# Patient Record
Sex: FEMALE | Race: WHITE | NOT HISPANIC OR LATINO | Employment: UNEMPLOYED | URBAN - METROPOLITAN AREA
[De-identification: names, ages, dates, MRNs, and addresses within clinical notes are randomized per-mention and may not be internally consistent; named-entity substitution may affect disease eponyms.]

---

## 2023-04-27 ENCOUNTER — OFFICE VISIT (OUTPATIENT)
Dept: FAMILY MEDICINE CLINIC | Facility: CLINIC | Age: 28
End: 2023-04-27

## 2023-04-27 VITALS
TEMPERATURE: 97.2 F | OXYGEN SATURATION: 96 % | WEIGHT: 223.7 LBS | DIASTOLIC BLOOD PRESSURE: 77 MMHG | HEIGHT: 65 IN | HEART RATE: 84 BPM | SYSTOLIC BLOOD PRESSURE: 118 MMHG | BODY MASS INDEX: 37.27 KG/M2

## 2023-04-27 DIAGNOSIS — F90.9 ATTENTION DEFICIT HYPERACTIVITY DISORDER (ADHD), UNSPECIFIED ADHD TYPE: ICD-10-CM

## 2023-04-27 DIAGNOSIS — Z00.00 ANNUAL PHYSICAL EXAM: Primary | ICD-10-CM

## 2023-04-27 DIAGNOSIS — Z86.69 HISTORY OF SEIZURE DISORDER: ICD-10-CM

## 2023-04-27 DIAGNOSIS — Z13.1 SCREENING FOR DIABETES MELLITUS: ICD-10-CM

## 2023-04-27 DIAGNOSIS — Z11.59 NEED FOR HEPATITIS C SCREENING TEST: ICD-10-CM

## 2023-04-27 DIAGNOSIS — Z23 ENCOUNTER FOR IMMUNIZATION: ICD-10-CM

## 2023-04-27 DIAGNOSIS — F32.9 CURRENT EPISODE OF MAJOR DEPRESSIVE DISORDER WITHOUT PRIOR EPISODE, UNSPECIFIED DEPRESSION EPISODE SEVERITY: ICD-10-CM

## 2023-04-27 DIAGNOSIS — Z13.29 SCREENING FOR THYROID DISORDER: ICD-10-CM

## 2023-04-27 DIAGNOSIS — Z13.6 SCREENING FOR CARDIOVASCULAR CONDITION: ICD-10-CM

## 2023-04-27 DIAGNOSIS — F41.9 ANXIETY: ICD-10-CM

## 2023-04-27 DIAGNOSIS — G47.33 OSA ON CPAP: ICD-10-CM

## 2023-04-27 DIAGNOSIS — Z11.4 SCREENING FOR HIV (HUMAN IMMUNODEFICIENCY VIRUS): ICD-10-CM

## 2023-04-27 DIAGNOSIS — Z99.89 OSA ON CPAP: ICD-10-CM

## 2023-04-27 DIAGNOSIS — J45.20 MILD INTERMITTENT ASTHMA WITHOUT COMPLICATION: ICD-10-CM

## 2023-04-27 DIAGNOSIS — Z13.1 SCREENING FOR DIABETES MELLITUS: Primary | ICD-10-CM

## 2023-04-27 RX ORDER — ALBUTEROL SULFATE 90 UG/1
2 AEROSOL, METERED RESPIRATORY (INHALATION) AS NEEDED
COMMUNITY

## 2023-04-27 RX ORDER — SERTRALINE HYDROCHLORIDE 100 MG/1
150 TABLET, FILM COATED ORAL DAILY
Qty: 145 TABLET | Refills: 1 | Status: SHIPPED | OUTPATIENT
Start: 2023-04-27

## 2023-04-27 RX ORDER — BUPROPION HYDROCHLORIDE 150 MG/1
1 TABLET ORAL DAILY
COMMUNITY
Start: 2023-04-25

## 2023-04-27 RX ORDER — BUDESONIDE AND FORMOTEROL FUMARATE DIHYDRATE 160; 4.5 UG/1; UG/1
2 AEROSOL RESPIRATORY (INHALATION) EVERY 4 HOURS PRN
COMMUNITY

## 2023-04-27 RX ORDER — SERTRALINE HYDROCHLORIDE 100 MG/1
1.5 TABLET, FILM COATED ORAL DAILY
COMMUNITY
Start: 2023-02-25 | End: 2023-04-27 | Stop reason: SDUPTHER

## 2023-04-27 RX ORDER — MELATONIN 5 MG
2 TABLET,CHEWABLE ORAL
COMMUNITY

## 2023-04-27 NOTE — PROGRESS NOTES
ADULT ANNUAL [facility]    NAME: Jim Griffin  AGE: 32 y o  SEX: female  : 1995     DATE: 2023     Assessment and Plan:     Problem List Items Addressed This Visit        Respiratory    HARLEY on CPAP  stable    Mild intermittent asthma    Relevant Medications    budesonide-formoterol (SYMBICORT) 160-4 5 mcg/act inhaler    albuterol (PROVENTIL HFA,VENTOLIN HFA) 90 mcg/act inhaler  Stable  She uses symbicort with respiratory infections  Has albuterol for PRN use  Does not need refills today  She is not sure if she has had pneumococcal vaccine  Will obtain records       Other    Anxiety    Relevant Medications    sertraline (ZOLOFT) 100 mg tablet    Current episode of major depressive disorder without prior episode    Relevant Medications    buPROPion (WELLBUTRIN XL) 150 mg 24 hr tablet    sertraline (ZOLOFT) 100 mg tablet  Scored 18 on PHQ 9 today  Attributes her depressive sx to her current life stressors  Declines referral to therapist/counselor  Advised her that I would be hesitant to decrease or d/c her zoloft right now due to the fact that her depression is not well controlled right now  We can always re-visit this down the road though advised that I would be hesitant to increase the wellbutrin (in fact not even a fan that she is taking this medication) due to her seizure history as this medication can lower seizure threshold  I did refill her zoloft 150 mg today  ADHD    Relevant Medications    buPROPion (WELLBUTRIN XL) 150 mg 24 hr tablet    sertraline (ZOLOFT) 100 mg tablet  Stable on wellbutrin   Other Visit Diagnoses     Annual physical exam    -  Primary  Discussed diet and exercise and some changes that she could make to diet with respect to sugary beverages, fast foods  Screening labs ordered  She is agreeable to HIV and Hep C screening  Cervical cancer screening reportedly up to date   Will obtain records from her gyn  Immunization History   Administered Date(s) Administered   • Tdap 04/27/2023     adacel ordered today  She is not sure if she has had pneumococcal vaccine  Need for hepatitis C screening test        Relevant Orders    Hepatitis C Ab W/Refl To HCV RNA, Qn, PCR ([facility])    Screening for HIV (human immunodeficiency virus)        Relevant Orders    HIV 1/2 Antigen/Antibody (Fourth Generation) with Reflex Testing ([facility], [facility], or EXTERNAL LAB)    Screening for thyroid disorder        Relevant Orders    TSH, 3rd generation with Free T4 reflex    Screening for diabetes mellitus        Relevant Orders    Comprehensive metabolic panel    Screening for cardiovascular condition        Relevant Orders    Lipid panel    Encounter for immunization        Relevant Orders    TDAP VACCINE GREATER THAN OR EQUAL TO 6YO IM    BMI 37 0-37 9, adult        Relevant Orders    Ambulatory Referral to Weight Management          Immunizations and preventive care screenings were discussed with patient today  Appropriate education was printed on patient's after visit summary  Counseling:  Alcohol/drug use: discussed moderation in alcohol intake, the recommendations for healthy alcohol use, and avoidance of illicit drug use  Dental Health: discussed importance of regular tooth brushing, flossing, and dental visits  Exercise: the importance of regular exercise/physical activity was discussed  Recommend exercise 3-5 times per week for at least 30 minutes  Return in about 3 months (around 7/27/2023) for Recheck  Chief Complaint:     Chief Complaint   Patient presents with   • Establish Care     Patient is in the office to Establish Care with Dr Anna Gaines  No viewable records  Past PCP- Chela Salas, [address] [place] [address] / [place], [place]   Stated concerns - 1  Overdue for annual physical 2  Med review - please review and take over historical meds   3  Weight loss - per pt, she is eating better and exercising, feels meds prevent   Annual -per pt, over due  [facility]- 2021 - [facility]   Tdap - overdue - R arm   Lab- Jovan Iron      History of Present Illness:     Adult Annual Physical   Patient is a 32year old female who is being seen today as a new patient  for a comprehensive physical exam      Previous PCP=Beti CALLE at [facility] in [place], [place]  No records for review at time of today's visit  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  Saw gyn for her cervical cancer screening  ([facility])  Due for adacel  She is agreeable to getting today  Patient's chronic medical problems include anxiety ,depression, ADHD, asthma and HARLEY  Patient is taking bupropion  mg once daily as well as zoloft 150 mg once daily as previously prescribed by her previous PCP  Has only been on the wellbutrin for 1 1/2 years  States that this was prescribed for her ADHD  She does have history of seizure disorder--last seizure 2018  Has seen neurology since and had EEG  Medication was reportedly d/c'd in 2020--patient reportedly stopped in on her own and has not seen neurology since d/c medication in 2020  She is wondering if she could come off of her zoloft and instead just increase her wellbutrin  Previously hospitalized for suicide attempt in 2018  She was seeing a therapist when living in [place]  Has not gone since prior to covid  She has never seen psych  She declines referral to counselor today  Thinks her elevated depression screening score is secondary to her life stressors  She has finals coming up, relationship difficulties, is looking for internship and may have to move   Hoping to go to [employer] or [employer] for internship    PHQ-2/9 Depression Screening    Little interest or pleasure in doing things: 2 - more than half the days  Feeling down, depressed, or hopeless: 1 - several days  Trouble falling or staying asleep, or sleeping too much: 1 - several days  Feeling tired or having little energy: 1 - several days  Poor appetite or overeatin - more than half the days  Feeling bad about yourself - or that you are a failure or have let yourself or your family down: 3 - nearly every day  Trouble concentrating on things, such as reading the newspaper or watching television: 3 - nearly every day  Moving or speaking so slowly that other people could have noticed  Or the opposite - being so fidgety or restless that you have been moving around a lot more than usual: 3 - nearly every day  Thoughts that you would be better off dead, or of hurting yourself in some way: 2 - more than half the days  PHQ-9 Score: 18   PHQ-9 Interpretation: Moderately severe depression        VALERIA-7 Flowsheet Screening    Flowsheet Row Most Recent Value   Over the last 2 weeks, how often have you been bothered by any of the following problems? Feeling nervous, anxious, or on edge 2  [Usually just sometimes, but currently its Finals time in school ]   Not being able to stop or control worrying 0   Worrying too much about different things 0   Trouble relaxing 0   Being so restless that it is hard to sit still 3   Becoming easily annoyed or irritable 1   Feeling afraid as if something awful might happen 0   VALERIA-7 Total Score 6            For her asthma, is using symbicort and prn albuterol  Only uses the symbicort when she gets a cold or upper respiratory infection  She does not need refills today  The patient reports needs refills on her medications  She is frustrated with her weight  Gained about 50 lbs in 4 month time period in 2019 while dating someone and they ate a lot of fast food  Difficulty getting it off since then   Has tried weight watchers in past      Diet and Physical Activity  Diet/Nutrition: veggies are frozen  she eats fast food at least twice weekly  sweetened beverages (coffee with sweet cream) at least 4 days per week      Exercise: walking  20 minutes at incline on treadmill in her apartment complex gym  2-3 days per week  Depression Screening  PHQ-2/9 Depression Screening    Little interest or pleasure in doing things: 2 - more than half the days  Feeling down, depressed, or hopeless: 1 - several days  Trouble falling or staying asleep, or sleeping too much: 1 - several days  Feeling tired or having little energy: 1 - several days  Poor appetite or overeatin - more than half the days  Feeling bad about yourself - or that you are a failure or have let yourself or your family down: 3 - nearly every day  Trouble concentrating on things, such as reading the newspaper or watching television: 3 - nearly every day  Moving or speaking so slowly that other people could have noticed  Or the opposite - being so fidgety or restless that you have been moving around a lot more than usual: 3 - nearly every day  Thoughts that you would be better off dead, or of hurting yourself in some way: 2 - more than half the days  PHQ-9 Score: 18   PHQ-9 Interpretation: Moderately severe depression        General Health  Sleep: as long as she wears her CPAP  Damaris [place] Hearing: normal - bilateral   Vision: wears glasses  Dental: no dental visits for >1 year  /GYN Health  Last menstrual period: does not get menses on her IUD (syla)   Review of Systems:     Review of Systems   Past Medical History:     Past Medical History:   Diagnosis Date   • ADHD    • Anxiety    • Asthma 2005   • Attempted suicide [facility])     hospitalized in 2018  [facility] in [place]     • Depression    • Dyslexia     since age 8 (fifth grade)   • Epilepsy ([address] )     since age 9 - declared seizure free at age 13 - medication free since age 13   • Heart murmur    • Hypoglycemia    • Sleep apnea     uses cpap machine since       Past Surgical History:     Past Surgical History:   Procedure Laterality Date   • WISDOM TOOTH EXTRACTION Bilateral 2009      Social History:     Social History     Socioeconomic History   • Marital status: Single     Spouse name: None   • Number of children: None   • Years of education: None   • Highest education level: None   Occupational History   • None   Tobacco Use   • Smoking status: Never   • Smokeless tobacco: Never   Vaping Use   • Vaping Use: Never used   Substance and Sexual Activity   • Alcohol use: Not Currently     Comment: 1 drink per month   • Drug use: Never   • Sexual activity: Yes     Partners: Male     Birth control/protection: I U D  Other Topics Concern   • None   Social History Narrative   • None     Social Determinants of Health     Financial Resource Strain: Not on file   Food Insecurity: Not on file   Transportation Needs: Not on file   Physical Activity: Not on file   Stress: Not on file   Social Connections: Not on file   Intimate Partner Violence: Not on file   Housing Stability: Not on file      Family History:     Family History   Problem Relation Age of Onset   • Asthma Mother    • Allergies Mother    • Urinary tract infection Mother    • Skin cancer Maternal Grandmother    • Asthma Maternal Grandfather    • Emphysema Maternal Grandfather    • Heart attack Maternal Grandfather          from heart attack at age 80   • Appendicitis Paternal Grandfather         at age 3   • Skin cancer Paternal Grandfather    • Stroke Paternal Grandfather         several      Current Medications:     Current Outpatient Medications   Medication Sig Dispense Refill   • albuterol (PROVENTIL HFA,VENTOLIN HFA) 90 mcg/act inhaler Inhale 2 puffs if needed for wheezing     • budesonide-formoterol (SYMBICORT) 160-4 5 mcg/act inhaler Inhale 2 puffs every 4 (four) hours as needed Rinse mouth after use       • buPROPion (WELLBUTRIN XL) 150 mg 24 hr tablet Take 1 tablet by mouth in the morning "  • levonorgestrel (AIDA) 13 5 MG intrauterine device 1 Intra Uterine Device by Intrauterine route once     • Melatonin 5 MG CHEW Chew 2 each daily at bedtime as needed     • sertraline (ZOLOFT) 100 mg tablet Take 1 5 tablets (150 mg total) by mouth in the morning 145 tablet 1     No current facility-administered medications for this visit  Allergies: Allergies   Allergen Reactions   • Bee Venom Swelling   • Dust Mite Extract Sneezing     Several sneezes in a row  • Sulfa Antibiotics Rash     Gets a rash on the whole body, but it is not itchy just red  Sulfa in shampoo as a preservative causes hair loss for the patient  Physical Exam:     /77 (BP Location: Left arm, Patient Position: Sitting, Cuff Size: Large)   Pulse 84   Temp (!) 97 2 °F (36 2 °C) (Tympanic)   Ht 5' 5\" (1 651 m)   Wt 101 kg (223 lb 11 2 oz)   LMP  (LMP Unknown)   SpO2 96%   BMI 37 23 kg/m²     Physical Exam  Vitals and nursing note reviewed  Constitutional:       General: She is not in acute distress  Appearance: Normal appearance  She is obese  She is not ill-appearing, toxic-appearing or diaphoretic  HENT:      Head: Normocephalic and atraumatic  Right Ear: Tympanic membrane normal       Left Ear: Tympanic membrane normal       Nose: Nose normal       Mouth/Throat:      Mouth: Mucous membranes are moist    Eyes:      Extraocular Movements: Extraocular movements intact  Conjunctiva/sclera: Conjunctivae normal       Pupils: Pupils are equal, round, and reactive to light  Cardiovascular:      Rate and Rhythm: Normal rate and regular rhythm  Heart sounds: No murmur heard  Pulmonary:      Effort: Pulmonary effort is normal       Breath sounds: Normal breath sounds  Abdominal:      General: Abdomen is flat  Bowel sounds are normal  There is no distension  Palpations: Abdomen is soft  There is no mass  Tenderness: There is no abdominal tenderness     Musculoskeletal:      Cervical " back: Normal range of motion and neck supple  Right lower leg: No edema  Left lower leg: No edema  Lymphadenopathy:      Cervical: No cervical adenopathy  Skin:     General: Skin is warm and dry  Findings: No rash  Neurological:      General: No focal deficit present  Mental Status: She is alert and oriented to person, place, and time  Psychiatric:         Mood and Affect: Mood normal          Behavior: Behavior normal          Thought Content:  Thought content normal          Judgment: Judgment normal           Vaishnavi Mei DO   [facility]

## 2023-04-28 ENCOUNTER — TELEPHONE (OUTPATIENT)
Dept: ADMINISTRATIVE | Facility: OTHER | Age: 28
End: 2023-04-28

## 2023-04-28 NOTE — LETTER
Procedure Request Form: Cervical Cancer Screening      Date Requested: 23  Patient: Clearence Kennel  Patient : 1995   Referring Provider: Alla Hunt, DO        Date of Procedure ______________________________       The above patient has informed us that they have completed their   most recent Cervical Cancer Screening at your facility  Please complete   this form and attach all corresponding procedure reports/results  Comments __________________________________________________________  ____________________________________________________________________  ____________________________________________________________________  ____________________________________________________________________    Facility Completing Procedure _________________________________________    Form Completed By (print name) _______________________________________      Signature __________________________________________________________      These reports are needed for  compliance  Please fax this completed form and a copy of the procedure report to our office located at [place] [address] as soon as possible to Fax [phone] pablo Bradford: Phone [phone]    We thank you for your assistance in treating our mutual patient

## 2023-04-28 NOTE — TELEPHONE ENCOUNTER
----- Message from Kajal Raymundo sent at 4/27/2023  3:00 PM EDT -----  Regarding: care gap request - PAP  04/27/23 3:00 PM    Hello, our patient attached above has had Pap Smear (HPV) aka Cervical Cancer Screening completed/performed  Please assist in updating the patient chart by making an External outreach to [address] [place] [address] / [facility]  The date of service is 2021      Thank you,  Kajal Raymundo  [facility]

## 2023-04-28 NOTE — PROGRESS NOTES
Upon review of the In Basket request and the patient's chart, initial outreach has been made via fax to facility  Please see Contacts section for details       Thank you  Melita Benton

## 2023-05-02 ENCOUNTER — TELEPHONE (OUTPATIENT)
Dept: FAMILY MEDICINE CLINIC | Facility: CLINIC | Age: 28
End: 2023-05-02

## 2023-05-02 LAB
ALBUMIN SERPL-MCNC: 4.2 G/DL (ref 3.6–5.1)
ALBUMIN/GLOB SERPL: 1.2 (CALC) (ref 1–2.5)
ALP SERPL-CCNC: 85 U/L (ref 31–125)
ALT SERPL-CCNC: 24 U/L (ref 6–29)
AST SERPL-CCNC: 20 U/L (ref 10–30)
BILIRUB SERPL-MCNC: 0.7 MG/DL (ref 0.2–1.2)
BUN SERPL-MCNC: 7 MG/DL (ref 7–25)
BUN/CREAT SERPL: NORMAL (CALC) (ref 6–22)
CALCIUM SERPL-MCNC: 9.8 MG/DL (ref 8.6–10.2)
CHLORIDE SERPL-SCNC: 101 MMOL/L (ref 98–110)
CHOLEST SERPL-MCNC: 164 MG/DL
CHOLEST/HDLC SERPL: 3.6 (CALC)
CO2 SERPL-SCNC: 29 MMOL/L (ref 20–32)
CREAT SERPL-MCNC: 0.62 MG/DL (ref 0.5–0.96)
GFR/BSA.PRED SERPLBLD CYS-BASED-ARV: 125 ML/MIN/1.73M2
GLOBULIN SER CALC-MCNC: 3.5 G/DL (CALC) (ref 1.9–3.7)
GLUCOSE SERPL-MCNC: 83 MG/DL (ref 65–99)
HCV AB S/CO SERPL IA: 0.09
HCV AB SERPL QL IA: NORMAL
HDLC SERPL-MCNC: 45 MG/DL
HIV 1+2 AB+HIV1 P24 AG SERPL QL IA: NORMAL
LDLC SERPL CALC-MCNC: 96 MG/DL (CALC)
NONHDLC SERPL-MCNC: 119 MG/DL (CALC)
POTASSIUM SERPL-SCNC: 4.2 MMOL/L (ref 3.5–5.3)
PROT SERPL-MCNC: 7.7 G/DL (ref 6.1–8.1)
SODIUM SERPL-SCNC: 137 MMOL/L (ref 135–146)
TRIGL SERPL-MCNC: 130 MG/DL
TSH SERPL-ACNC: 1.42 MIU/L

## 2023-05-02 NOTE — RESULT ENCOUNTER NOTE
Can let patient know that her labs were all okay  Blood sugar normal  Thyroid okay  Hep c and HIV screens were negative   Thyroid normal

## 2023-05-02 NOTE — TELEPHONE ENCOUNTER
----- Message from Donna Shaw DO sent at 5/2/2023  8:23 AM EDT -----  Can let patient know that her labs were all okay  Blood sugar normal  Thyroid okay  Hep c and HIV screens were negative   Thyroid normal

## 2023-05-08 NOTE — TELEPHONE ENCOUNTER
Patient is returning a missed call from [facility] regarding recent lab results

## 2023-05-09 NOTE — TELEPHONE ENCOUNTER
As a follow-up, a second attempt has been made for outreach via fax to facility  Please see Contacts section for details      Thank you  Juan Miguel Almanzar

## 2023-05-10 NOTE — TELEPHONE ENCOUNTER
Upon review of the In Basket request we were able to locate, review, and update the patient chart as requested for Pap Smear (HPV) aka Cervical Cancer Screening  Any additional questions or concerns should be emailed to the Practice Liaisons via the appropriate education email address, please do not reply via In Basket      Thank you  Yvette Sterling

## 2023-07-20 ENCOUNTER — RA CDI HCC (OUTPATIENT)
Dept: OTHER | Facility: HOSPITAL | Age: 28
End: 2023-07-20

## 2023-07-20 PROBLEM — E66.01 MORBID OBESITY (HCC): Status: ACTIVE | Noted: 2023-07-20

## 2023-07-20 NOTE — PROGRESS NOTES
[facility] coding opportunities          Chart Reviewed number of suggestions sent to Provider: 1  E66.01     Patients Insurance        Commercial Insurance: [address]

## 2023-08-02 ENCOUNTER — OFFICE VISIT (OUTPATIENT)
Dept: FAMILY MEDICINE CLINIC | Facility: CLINIC | Age: 28
End: 2023-08-02
Payer: COMMERCIAL

## 2023-08-02 VITALS
DIASTOLIC BLOOD PRESSURE: 60 MMHG | SYSTOLIC BLOOD PRESSURE: 124 MMHG | HEIGHT: 66 IN | OXYGEN SATURATION: 96 % | WEIGHT: 229.4 LBS | TEMPERATURE: 97.2 F | BODY MASS INDEX: 36.87 KG/M2 | HEART RATE: 94 BPM

## 2023-08-02 DIAGNOSIS — F41.9 ANXIETY: ICD-10-CM

## 2023-08-02 DIAGNOSIS — F32.9 CURRENT EPISODE OF MAJOR DEPRESSIVE DISORDER WITHOUT PRIOR EPISODE, UNSPECIFIED DEPRESSION EPISODE SEVERITY: Primary | ICD-10-CM

## 2023-08-02 DIAGNOSIS — E66.01 MORBID OBESITY (HCC): ICD-10-CM

## 2023-08-02 DIAGNOSIS — F90.9 ATTENTION DEFICIT HYPERACTIVITY DISORDER (ADHD), UNSPECIFIED ADHD TYPE: ICD-10-CM

## 2023-08-02 PROBLEM — E55.9 VITAMIN D DEFICIENCY: Status: ACTIVE | Noted: 2017-07-17

## 2023-08-02 PROCEDURE — 99213 OFFICE O/P EST LOW 20 MIN: CPT | Performed by: FAMILY MEDICINE

## 2023-08-02 NOTE — PROGRESS NOTES
Name: Cindy Dong      : 1995      MRN: [id number]  Encounter Provider: Pamela Pastor DO  Encounter Date: 2023   Encounter department: [address] [place] [address]     1. Current episode of major depressive disorder without prior episode, unspecified depression episode severity    2. Anxiety    3. Attention deficit hyperactivity disorder (ADHD), unspecified ADHD type    4. Morbid obesity ([address])  -     Ambulatory Referral to Weight Management; Future      BMI Counseling: Body mass index is 37.31 kg/m². The BMI is above normal. Nutrition recommendations include decreasing portion sizes, encouraging healthy choices of fruits and vegetables and moderation in carbohydrate intake. Exercise recommendations include exercising 3-5 times per week. No pharmacotherapy was ordered. Patient referred to weight management. Rationale for BMI follow-up plan is due to patient being overweight or obese. Subjective     HPI   Patient is a 29year old female with anxiety, depression, ADHD, history seizure disorder and HARLEY here today for f/u on her anxiety and depression. She is currently taking zoloft 100, bupropion Xl 150 mg daily. At time of her new patient visit in April she was inquiring about possibility of weaning off of her zoloft. Her depression was not well controlled. She scored 18 on PHQ 9. Attributed her depressive sx to her current life stressors. She declined referral to therapist/counselor. I advised her that I was hesitant to decrease or d/c her zoloft at that time due to the fact that her depression is not well controlled. I also advised that increasing wellbutrin would not be in her best interest either given prior history of seizure disorder knowing that bupropion can decrease seizure threshold. States that she is doing great. Not seeing counselor. No formal exercise.  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 She had abnormal pap (ASCUS with +HPV) in . Never followed up due to loss of insurance. She declines referral to local gyn and wants to go back to [place] to see same gyn. She is requesting referral to weight management. I had given her one to bariatrics. She wants to go to see weight management but is not interested in surgery. Started taking an otc gummy with apple cider vinegar, chromium. Review of Systems    Past Medical History:   Diagnosis Date   • ADHD    • Anxiety    • Asthma 2005   • Attempted suicide [facility])     hospitalized in 2018. [facility] in [facility].    • Atypical squamous cell changes of undetermined significance (ASCUS) on cervical cytology with positive high risk human papilloma virus (HPV)    • Depression    • Dyslexia     since age 8 (fifth grade)   • Epilepsy ([address] )     since age 9 - declared seizure free at age 13 - medication free since age 13   • Heart murmur    • Hypoglycemia    • Sleep apnea     uses cpap machine since      Past Surgical History:   Procedure Laterality Date   • WISDOM TOOTH EXTRACTION Bilateral      Family History   Problem Relation Age of Onset   • Asthma Mother    • Allergies Mother    • Urinary tract infection Mother    • Skin cancer Maternal Grandmother    • Asthma Maternal Grandfather    • Emphysema Maternal Grandfather    • Heart attack Maternal Grandfather          from heart attack at age 80   • Appendicitis Paternal Grandfather         at age 3   • Skin cancer Paternal Grandfather    • Stroke Paternal Grandfather         several     Social History     Socioeconomic History   • Marital status: Single     Spouse name: None   • Number of children: None   • Years of education: None   • Highest education level: None   Occupational History   • None   Tobacco Use   • Smoking status: Never   • Smokeless tobacco: Never   Vaping Use   • Vaping Use: Never used   Substance and Sexual Activity   • Alcohol use: Not Currently     Comment: 1 drink per month   • Drug use: Never   • Sexual activity: Yes     Partners: Male     Birth control/protection: I.U.D. Other Topics Concern   • None   Social History Narrative    Conservation and wildlife management     Social Determinants of Health     Financial Resource Strain: Not on file   Food Insecurity: Not on file   Transportation Needs: Not on file   Physical Activity: Not on file   Stress: Not on file   Social Connections: Not on file   Intimate Partner Violence: Not on file   Housing Stability: Not on file     Current Outpatient Medications on File Prior to Visit   Medication Sig   • albuterol (PROVENTIL HFA,VENTOLIN HFA) 90 mcg/act inhaler Inhale 2 puffs if needed for wheezing   • buPROPion (WELLBUTRIN XL) 150 mg 24 hr tablet Take 1 tablet by mouth in the morning   • levonorgestrel (AIDA) 13.5 MG intrauterine device 1 Intra Uterine Device by Intrauterine route once   • Melatonin 5 MG CHEW Chew 2 each daily at bedtime as needed   • sertraline (ZOLOFT) 100 mg tablet Take 1.5 tablets (150 mg total) by mouth in the morning   • budesonide-formoterol (SYMBICORT) 160-4.5 mcg/act inhaler Inhale 2 puffs every 4 (four) hours as needed Rinse mouth after use. (Patient not taking: Reported on 8/2/2023)     Allergies   Allergen Reactions   • Bee Venom Swelling   • Dust Mite Extract Sneezing     Several sneezes in a row. • Sulfa Antibiotics Rash     Gets a rash on the whole body, but it is not itchy just red. Sulfa in shampoo as a preservative causes hair loss for the patient.       Immunization History   Administered Date(s) Administered   • Tdap 04/27/2023       Objective     /60 (BP Location: Left arm, Patient Position: Sitting, Cuff Size: Large)   Pulse 94   Temp (!) 97.2 °F (36.2 °C) (Tympanic)   Ht 5' 5.75" (1.67 m)   Wt 104 kg (229 lb 6.4 oz)   LMP  (LMP Unknown)   SpO2 96%   BMI 37.31 kg/m²     Physical Exam  Vitals and nursing note reviewed. Constitutional:       General: She is not in acute distress. Appearance: Normal appearance. She is obese. She is not ill-appearing or diaphoretic. HENT:      Head: Normocephalic and atraumatic. Cardiovascular:      Rate and Rhythm: Normal rate and regular rhythm. Heart sounds: No murmur heard. Pulmonary:      Effort: Pulmonary effort is normal.      Breath sounds: Normal breath sounds. Musculoskeletal:      Cervical back: Normal range of motion and neck supple. Right lower leg: No edema. Left lower leg: No edema. Lymphadenopathy:      Cervical: No cervical adenopathy. Skin:     General: Skin is warm and dry. Findings: No rash. Neurological:      General: No focal deficit present. Mental Status: She is alert and oriented to person, place, and time.    Psychiatric:         Mood and Affect: Mood normal.       Loy Grider,

## 2023-09-27 NOTE — PROGRESS NOTES
Weight Management Medical Nutrition Assessment  Lita presented for a meal planning session. Today's weight is 229.4#. Previously completed Noom and Weight Watchers program. Reports she had trouble remembering to food log. Agreeable to try food logging again. Was a vegetarian in the past. No longer vegtarian but will eat plant based meats. Went to [employer] but does not always have time or energy to cook as she prefers to [employer] from scratch. Reports her concern are portion sizes. Discussed calorie goal, protein goal, and portion sizes. Encouraged patient to have a high protein snack on days she does not eat lunch. Hydration adequate most days. We developed and reviewed a low calorie meal plan. Patient seen by Medical Provider in past 6 months:  no  Requested to schedule appointment with Medical Provider: No      Anthropometric Measurements  Start Weight (#): 229.4#  Current Weight (#): 229.4#  TBW % Change from start weight: n/a  Ideal Body Weight (#): 128. 75#  Goal Weight (#):165-175#  Highest:  Lowest:    Weight Loss History  Previous weight loss attempts: Commercial Programs ([employer], Erenest Wolf, etc.), Noom    Food and Nutrition Related History  Wake up: 7-8AM, Fridays 6AM  Bed Time: n/a    Food Recall  Breakfast: 8:30AM granola bar (before 1st class), 11AM 2 scrambled eggs, toast or english muffin, onion, 2/3 cup Mexican shredded blend cheese    Snack:skip  Lunch: skip OR granola bar OR spinach and egg white feta wrap from [employer]  Snack: skip  Dinner: 7-8PM Morning Star silverio aguila OR Steph OR lobster ravioli with spicy marinara, green bell pepper, onion OR pasta  Snack: spoonful of strawberry ice cream    Beverages: 64oz water (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), lemonade, iced tea, 1 cup coffee with flavored coffeemate creamer  Alcohol: rare  Volume of beverage intake: 72oz    Weekends: will cook on weekends  Cravings: n/a  Trouble area of day: n/a    Frequency of Eating out: 3-4 times per week  Food restrictions: allergy to acorn squash   Cooking: self   Food Shopping: self    Physical Activity Intake  Activity: spends Fridays on a ranch (lifting, walking, etc.)   Frequency:occasionally  Physical limitations/barriers to exercise: n/a    Estimated Needs  Energy  [employer] Needs: BMR : 4254   1-2# loss weekly sedentary: 9674-5249             1-2# loss weekly lightly active: 0497-0724  Maintenance calories for sedentary activity level: 2138  Protein: 70-88g      (1.2-1.5g/kg IBW)  Fluid: 69oz    (35mL/kg IBW)    Nutrition Diagnosis  Yes; Overweight/obesity  related to Excess energy intake as evidenced by  BMI more than normative standard for age and sex (obesity-grade II 35-39. 9)       Nutrition Intervention    Nutrition Prescription  Calories: 1400 calories  Protein: 70-88g  Fluid: 69oz    Meal Plan (Barrera/Pro/Carb)  Snack: 200/5, +100 for beverage  Breakfast: 400/25  Lunch:  Snack: 150-200/5  Dinner: 500/25  Snack:    Nutrition Education:    Calorie controlled menu  Lean protein food choices  Healthy snack options  Food journaling tips      Nutrition Counseling:  Strategies: meal planning, portion sizes, healthy snack choices, hydration, fiber intake, protein intake, exercise, food journal      Monitoring and Evaluation:  Evaluation criteria:  Energy Intake  Meet protein needs  Maintain adequate hydration  Monitor weekly weight  Meal planning/preparation  Food journal   Decreased portions at mealtimes and snacks  Physical activity     Barriers to learning:none  Readiness to change: Preparation:  (Getting ready to change)   Comprehension: good  Expected Compliance: good

## 2023-10-03 ENCOUNTER — OFFICE VISIT (OUTPATIENT)
Dept: BARIATRICS | Facility: CLINIC | Age: 28
End: 2023-10-03

## 2023-10-03 VITALS — WEIGHT: 229.4 LBS | BODY MASS INDEX: 38.22 KG/M2 | HEIGHT: 65 IN

## 2023-10-03 DIAGNOSIS — E66.01 MORBID OBESITY (HCC): ICD-10-CM

## 2023-10-03 DIAGNOSIS — R63.5 ABNORMAL WEIGHT GAIN: ICD-10-CM

## 2023-10-03 PROCEDURE — RECHECK

## 2023-10-03 PROCEDURE — WMDI30

## 2023-10-12 DIAGNOSIS — F32.9 CURRENT EPISODE OF MAJOR DEPRESSIVE DISORDER WITHOUT PRIOR EPISODE, UNSPECIFIED DEPRESSION EPISODE SEVERITY: ICD-10-CM

## 2023-10-12 DIAGNOSIS — F41.9 ANXIETY: Primary | ICD-10-CM

## 2023-10-12 RX ORDER — BUPROPION HYDROCHLORIDE 150 MG/1
150 TABLET ORAL DAILY
Qty: 90 TABLET | Refills: 0 | Status: SHIPPED | OUTPATIENT
Start: 2023-10-12

## 2023-10-25 NOTE — PROGRESS NOTES
Weight Management Medical Nutrition Assessment  Lita presented for a meal planning session. Today's weight is 222.4#. She lost 7# x 3 weeks (3.1%TBW). Patient states feeling better. Reports hypoglycemia when she does not eat for at least 5-6 hours. This usually happens when she gets distracted or hyper fixated on a task per patient report. She is keeping a food journal. Reports food logging is helpful. Addressed patient question re metabolism supplement. Will decide menu planning vs bundles at next visit. Patient seen by Medical Provider in past 6 months:  no  Requested to schedule appointment with Medical Provider: No      Anthropometric Measurements  Start Weight (#): 229.4#  Current Weight (#): 222.4#  TBW % Change from start weight: 3.1%  Ideal Body Weight (#): 128. 75#  Goal Weight (#):165-175#  Highest:  Lowest:    Weight Loss History  Previous weight loss attempts: Commercial Programs ([employer], Chas Pepper, etc.), Noom    Food and Nutrition Related History  Wake up: 7-8AM, Fridays 6AM, or 11AM (if she does not have classes)  Bed Time: n/a    Food Recall  Breakfast: 8:30AM-11AM sandwich bros (xavi with egg and cheese) OR scrambled/fried egg sandwich    Snack:skip  Lunch: skip OR Honey Bunches of Oats cereal  bread with tuna  Snack: skip  Dinner: 7-8PM Morning Star silverio aguila OR Steph OR lobster ravioli with spicy marinara, green bell pepper, onion OR pasta  Snack: spoonful of strawberry ice cream    Beverages: 64oz water (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- sometimes uses Davey for flavor), lemonade, iced tea, 1 cup coffee with flavored coffeemate creamer  Alcohol: rare  Volume of beverage intake: 72oz    Weekends: will cook on weekends  Cravings: n/a  Trouble area of day: n/a    Frequency of Eating out: 3-4 times per week  Food restrictions: allergy to acorn squash   Cooking: self   Food Shopping: self    Physical Activity Intake  Activity: spends Fridays on a ranch (lifting, walking, etc.)   Frequency:occasionally  Physical limitations/barriers to exercise: n/a    Estimated Needs   Energy  [employer] Needs: BMR : 1957   1-2# loss weekly sedentary: 9152-2613            1-2# loss weekly lightly active: 0043-2727  Maintenance calories for sedentary activity level: 2092  Protein: 70-88g      (1.2-1.5g/kg IBW)  Fluid: 69oz    (35mL/kg IBW)    Nutrition Diagnosis  Yes; Overweight/obesity  related to Excess energy intake as evidenced by  BMI more than normative standard for age and sex (obesity-grade II 35-39. 9)       Nutrition Intervention    Nutrition Prescription  Calories: 1400 calories  Protein: 70-88g  Fluid: 69oz    Meal Plan (Barrera/Pro/Carb)  Snack: 200/5, +100 for beverage  Breakfast: 400/25  Lunch:  Snack: 150-200/5  Dinner: 500/25  Snack:    Nutrition Education:    Calorie controlled menu  Lean protein food choices  Healthy snack options  Food journaling tips      Nutrition Counseling:  Strategies: meal planning, portion sizes, healthy snack choices, hydration, fiber intake, protein intake, exercise, food journal      Monitoring and Evaluation:  Evaluation criteria:  Energy Intake  Meet protein needs  Maintain adequate hydration  Monitor weekly weight  Meal planning/preparation  Food journal   Decreased portions at mealtimes and snacks  Physical activity     Barriers to learning:none  Readiness to change: Action:  (Changing behavior)  Comprehension: good  Expected Compliance: good

## 2023-11-01 ENCOUNTER — OFFICE VISIT (OUTPATIENT)
Dept: BARIATRICS | Facility: CLINIC | Age: 28
End: 2023-11-01

## 2023-11-01 VITALS — BODY MASS INDEX: 37.05 KG/M2 | WEIGHT: 222.4 LBS | HEIGHT: 65 IN

## 2023-11-01 DIAGNOSIS — R63.5 ABNORMAL WEIGHT GAIN: Primary | ICD-10-CM

## 2023-11-01 PROCEDURE — RECHECK

## 2023-11-01 PROCEDURE — WMDI30

## 2023-12-11 NOTE — PROGRESS NOTES
Weight Management Medical Nutrition Assessment  Lita presented for a new start session for the Healthy Core Program. Today's weight is 224.7#. Gain of 2.3# x 6 weeks. Continues to monitor portion sizes but is not logging in a food journal. Recently had finals for school and did not have time to food log. Encouraged her to log a couple of days per week. She reports she is not meeting protein goal due to eating 2 meals per day. Now that school is finished she plans to resume food logging as this keeps her accountable. Completed a body composition using SECA scale and reviewed results with patient. Provided product samples.    Patient seen by Medical Provider in past 6 months:  no  Requested to schedule appointment with Medical Provider: No      Anthropometric Measurements  Start Weight (#): 229.4#  Current Weight (#): 224.7#  TBW % Change from start weight: 2.1%  Ideal Body Weight (#): 128.75#  Goal Weight (#):165-175#  Highest:  Lowest:    Weight Loss History  Previous weight loss attempts: Commercial Programs (Weight Watchers, Renetta Fonseca, etc.), Noom    Food and Nutrition Related History  Wake up: 7-8AM, Fridays 6AM, or 11AM (if she does not have classes)  Bed Time: n/a    Food Recall  Breakfast: 8:30AM-11AM sandwich bros (xavi with egg and cheese) OR scrambled/fried egg sandwich    Snack:skip  Lunch: skip OR Honey Bunches of Oats cereal  bread with tuna  Snack: skip  Dinner: 7-8PM Morning Star silverio aguila OR Steph OR lobster ravioli with spicy marinara, green bell pepper, onion OR pasta  Snack: spoonful of strawberry ice cream    Beverages: 64oz water (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- sometimes uses Davey for flavor), lemonade, iced tea, 1 cup coffee with flavored coffeemate creamer  Alcohol: rare  Volume of beverage intake: 72oz    Weekends: will cook on weekends  Cravings: n/a  Trouble area of day: n/a    Frequency of Eating out: 3-4 times per week  Food restrictions: allergy to  acorn squash   Cooking: self   Food Shopping: self    Physical Activity Intake  Activity: spends Fridays on a ranch (lifting, walking, etc.)   Frequency:occasionally  Physical limitations/barriers to exercise: n/a    Estimated Needs   Energy  [employer] Needs: BMR : 1754   1-2# loss weekly sedentary: 8895-0633            1-2# loss weekly lightly active: 1839-2680  Maintenance calories for sedentary activity level: 2105  Protein: 70-88g      (1.2-1.5g/kg IBW)  Fluid: 69oz    (35mL/kg IBW)    Nutrition Diagnosis  Yes;    Overweight/obesity  related to Excess energy intake as evidenced by  BMI more than normative standard for age and sex (obesity-grade II 35-39.9)       Nutrition Intervention    Nutrition Prescription  Calories: 1400 calories  Protein: 70-88g  Fluid: 69oz    Meal Plan (Barrera/Pro/Carb)  Snack: 200/5, +100 for beverage  Breakfast: 400/25  Lunch:  Snack: 150-200/5  Dinner: 500/25  Snack:    Nutrition Education:    Calorie controlled menu  Lean protein food choices  Healthy snack options  Food journaling tips  Healthy Core Manual      Nutrition Counseling:  Strategies: meal planning, portion sizes, healthy snack choices, hydration, fiber intake, protein intake, exercise, food journal      Monitoring and Evaluation:  Evaluation criteria:  Energy Intake  Meet protein needs  Maintain adequate hydration  Monitor weekly weight  Meal planning/preparation  Food journal   Decreased portions at mealtimes and snacks  Physical activity     Barriers to learning:none  Readiness to change: Action:  (Changing behavior)  Comprehension: good  Expected Compliance: good

## 2023-12-18 ENCOUNTER — OFFICE VISIT (OUTPATIENT)
Dept: BARIATRICS | Facility: CLINIC | Age: 28
End: 2023-12-18

## 2023-12-18 VITALS — BODY MASS INDEX: 37.44 KG/M2 | HEIGHT: 65 IN | WEIGHT: 224.7 LBS

## 2023-12-18 DIAGNOSIS — R63.5 ABNORMAL WEIGHT GAIN: Primary | ICD-10-CM

## 2023-12-18 PROCEDURE — RECHECK

## 2023-12-18 PROCEDURE — WMPRO12

## 2024-01-04 ENCOUNTER — CLINICAL SUPPORT (OUTPATIENT)
Dept: BARIATRICS | Facility: CLINIC | Age: 29
End: 2024-01-04

## 2024-01-04 VITALS — HEIGHT: 65 IN | WEIGHT: 221.2 LBS | BODY MASS INDEX: 36.85 KG/M2

## 2024-01-04 DIAGNOSIS — R63.5 ABNORMAL WEIGHT GAIN: Primary | ICD-10-CM

## 2024-01-04 PROCEDURE — RECHECK

## 2024-01-08 NOTE — PROGRESS NOTES
Weight Management Medical Nutrition Assessment  Lita presented for a month 1 f/u visit. Today's weight is 222.8#. Loss of 2# x 4weeks. Overall loss of  6.6# (2.9%TBW).  Continues to monitor portion sizes but is logging in a food journal Monday through Friday. Felt a bit chaotic from the holiday. Maintenance calories (2000) at this time. Activity has not changed but will resume more movement on Mondays. She reports classes are helpful in holding accountability. Aims to strive on motivation when it comes to weight loss. Will be returning back to school for final semester.     Patient seen by Medical Provider in past 6 months:  no  Requested to schedule appointment with Medical Provider: No      Anthropometric Measurements  Start Weight (#): 229.4#  Current Weight (#): 222.8#  TBW % Change from start weight: 2.9%  Ideal Body Weight (#): 128.75#  Goal Weight (#):165-175#  Highest:  Lowest:    Weight Loss History  Previous weight loss attempts: Commercial Programs (Weight Watchers, Renetta Fonseca, etc.), Noom    Food and Nutrition Related History  Wake up: 7-8AM, Fridays 6AM, or 11AM (if she does not have classes)  Bed Time: n/a    Food Recall  Breakfast: 8:30AM-11AM sandwich bros (xavi with egg and cheese) OR scrambled/fried egg sandwich; oatmeal with water (high fiber/protein when available)   Snack:skip  Lunch: skip due to sleeping in later; OR Honey Bunches of Oats cereal  bread with tuna  Snack: skip  Dinner: 7-8PM Morning Star veggie burger with guacamole and fried pickles; OR Ramen OR lobster javi with spicy marinara, green bell pepper, onion OR pasta  Snack: nothing at this time    Beverages: 64oz water (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- sometimes uses Davey for flavor), lemonade,  1 cup coffee with 20 calorie creamer [place]; [employer] coffee   Alcohol: rare  Volume of beverage intake: 72oz    Weekends: will cook on weekends  Cravings: n/a  Trouble area of day:  n/a    Frequency of Eating out: 3-4 times per week  Food restrictions: allergy to acorn squash   Cooking: self   Food Shopping: self    Physical Activity Intake  Activity: spends Fridays on a ranch (lifting, walking, etc.); adding exercise class on Mondays   Frequency:occasionally  Physical limitations/barriers to exercise: n/a    Estimated Needs   Energy  [employer] Energy Needs: BMR : 1754   1-2# loss weekly sedentary: 1421-6420            1-2# loss weekly lightly active: 3882-2519  Maintenance calories for sedentary activity level: 2105  Protein: 70-88g      (1.2-1.5g/kg IBW)  Fluid: 69oz    (35mL/kg IBW)    Nutrition Diagnosis  Yes;    Overweight/obesity  related to Excess energy intake as evidenced by  BMI more than normative standard for age and sex (obesity-grade II 35-39.9)       Nutrition Intervention    Nutrition Prescription  Calories: 1400 calories  Protein: 70-88g  Fluid: 69oz    Meal Plan (Barrera/Pro/Carb)  Snack: 200/5, +100 for beverage  Breakfast: 400/25  Lunch:  Snack: 150-200/5  Dinner: 500/25  Snack:    Nutrition Education:    Calorie controlled menu  Lean protein food choices  Healthy snack options  Food journaling tips  Healthy Core Manual      Nutrition Counseling:  Strategies: meal planning, portion sizes, healthy snack choices, hydration, fiber intake, protein intake, exercise, food journal      Monitoring and Evaluation:  Evaluation criteria:  Energy Intake  Meet protein needs  Maintain adequate hydration  Monitor weekly weight  Meal planning/preparation  Food journal   Decreased portions at mealtimes and snacks  Physical activity     Barriers to learning:none  Readiness to change: Action:  (Changing behavior)  Comprehension: good  Expected Compliance: good

## 2024-01-11 ENCOUNTER — CLINICAL SUPPORT (OUTPATIENT)
Dept: BARIATRICS | Facility: CLINIC | Age: 29
End: 2024-01-11

## 2024-01-11 VITALS — BODY MASS INDEX: 37.02 KG/M2 | HEIGHT: 65 IN | WEIGHT: 222.2 LBS

## 2024-01-11 DIAGNOSIS — R63.5 ABNORMAL WEIGHT GAIN: Primary | ICD-10-CM

## 2024-01-11 PROCEDURE — RECHECK

## 2024-01-15 ENCOUNTER — OFFICE VISIT (OUTPATIENT)
Dept: BARIATRICS | Facility: CLINIC | Age: 29
End: 2024-01-15

## 2024-01-15 VITALS — WEIGHT: 222.8 LBS | BODY MASS INDEX: 37.12 KG/M2 | HEIGHT: 65 IN

## 2024-01-15 DIAGNOSIS — R63.5 ABNORMAL WEIGHT GAIN: Primary | ICD-10-CM

## 2024-01-15 PROCEDURE — RECHECK

## 2024-01-18 ENCOUNTER — CLINICAL SUPPORT (OUTPATIENT)
Dept: BARIATRICS | Facility: CLINIC | Age: 29
End: 2024-01-18

## 2024-01-18 VITALS — BODY MASS INDEX: 36.75 KG/M2 | WEIGHT: 220.6 LBS | HEIGHT: 65 IN

## 2024-01-18 DIAGNOSIS — R63.5 ABNORMAL WEIGHT GAIN: Primary | ICD-10-CM

## 2024-01-18 PROCEDURE — RECHECK

## 2024-01-25 ENCOUNTER — CLINICAL SUPPORT (OUTPATIENT)
Dept: BARIATRICS | Facility: CLINIC | Age: 29
End: 2024-01-25

## 2024-01-25 VITALS — WEIGHT: 221.8 LBS | BODY MASS INDEX: 36.96 KG/M2 | HEIGHT: 65 IN

## 2024-01-25 DIAGNOSIS — R63.5 ABNORMAL WEIGHT GAIN: Primary | ICD-10-CM

## 2024-01-25 PROCEDURE — RECHECK

## 2024-02-01 ENCOUNTER — CLINICAL SUPPORT (OUTPATIENT)
Dept: BARIATRICS | Facility: CLINIC | Age: 29
End: 2024-02-01

## 2024-02-01 VITALS — BODY MASS INDEX: 35.62 KG/M2 | HEIGHT: 66 IN | WEIGHT: 221.6 LBS

## 2024-02-01 DIAGNOSIS — R63.5 ABNORMAL WEIGHT GAIN: Primary | ICD-10-CM

## 2024-02-01 PROCEDURE — RECHECK

## 2024-02-05 ENCOUNTER — OFFICE VISIT (OUTPATIENT)
Dept: FAMILY MEDICINE CLINIC | Facility: CLINIC | Age: 29
End: 2024-02-05
Payer: COMMERCIAL

## 2024-02-05 VITALS
HEART RATE: 83 BPM | BODY MASS INDEX: 35.68 KG/M2 | RESPIRATION RATE: 18 BRPM | TEMPERATURE: 99.1 F | OXYGEN SATURATION: 100 % | SYSTOLIC BLOOD PRESSURE: 130 MMHG | DIASTOLIC BLOOD PRESSURE: 70 MMHG | HEIGHT: 66 IN | WEIGHT: 222 LBS

## 2024-02-05 DIAGNOSIS — J45.20 MILD INTERMITTENT ASTHMA WITHOUT COMPLICATION: ICD-10-CM

## 2024-02-05 DIAGNOSIS — E55.9 VITAMIN D DEFICIENCY: ICD-10-CM

## 2024-02-05 DIAGNOSIS — Z13.29 SCREENING FOR THYROID DISORDER: ICD-10-CM

## 2024-02-05 DIAGNOSIS — Z13.6 SCREENING FOR CARDIOVASCULAR CONDITION: ICD-10-CM

## 2024-02-05 DIAGNOSIS — Z13.1 SCREENING FOR DIABETES MELLITUS: ICD-10-CM

## 2024-02-05 DIAGNOSIS — G47.33 OSA ON CPAP: ICD-10-CM

## 2024-02-05 DIAGNOSIS — J35.8 TONSILLITH: ICD-10-CM

## 2024-02-05 DIAGNOSIS — E66.01 MORBID OBESITY (HCC): ICD-10-CM

## 2024-02-05 DIAGNOSIS — F32.9 CURRENT EPISODE OF MAJOR DEPRESSIVE DISORDER WITHOUT PRIOR EPISODE, UNSPECIFIED DEPRESSION EPISODE SEVERITY: Primary | ICD-10-CM

## 2024-02-05 DIAGNOSIS — F41.9 ANXIETY: ICD-10-CM

## 2024-02-05 PROCEDURE — 99213 OFFICE O/P EST LOW 20 MIN: CPT | Performed by: FAMILY MEDICINE

## 2024-02-05 NOTE — PROGRESS NOTES
Name: Lita Verdugo      : 1995      MRN: [id number]  Encounter Provider: Jemma Vann DO  Encounter Date: 2024   Encounter department: [facility] PRIMARY CARE    Assessment & Plan     1. Current episode of major depressive disorder without prior episode, unspecified depression episode severity    2. Anxiety    3. Mild intermittent asthma without complication    4. HARLEY on CPAP    5. Morbid obesity (HCC)    6. Vitamin D deficiency  -     Vitamin D 1,25 dihydroxy; Future  -     Vitamin D 1,25 dihydroxy    7. BMI 36.0-36.9,adult    8. Tonsillith  -     Ambulatory Referral to Otolaryngology; Future    9. Screening for diabetes mellitus  -     Comprehensive metabolic panel; Future; Expected date: 2024  -     Comprehensive metabolic panel    10. Screening for cardiovascular condition  -     Lipid panel; Future; Expected date: 2024  -     Lipid panel    11. Screening for thyroid disorder  -     TSH, 3rd generation with Free T4 reflex; Future; Expected date: 2024  -     TSH, 3rd generation with Free T4 reflex         Subjective     HPI  Patient is a 28 year old female with allergies, asthma, anxiety, depression, ADHD, vitamin D deficiency, HARLEY on CPAP and history of seizure disorder who is being seen today for f/u on her chronic medical problems.  Did not get flu or covid vaccines this season and declines. Declines pneumococcal vaccine.    Patient Active Problem List   Diagnosis   • Anxiety--doing well on current medications.    • Current episode of major depressive disorder without prior episode   • ADHD   • HARLEY on CPAP--using cpap as recommended and doing well.    • Mild intermittent asthma--has rx for symbicort that she uses only when she is sick. Uses the albuterol prn. Used 2  weeks ago while dancing.    • History of seizure disorder   • Vitamin D deficiency   • Seasonal allergic rhinitis   • BMI 36.0-36.9,adult     PHQ-2/9 Depression Screening    Little interest or  pleasure in doing things: 0 - not at all  Feeling down, depressed, or hopeless: 0 - not at all  Trouble falling or staying asleep, or sleeping too much: 0 - not at all  Feeling tired or having little energy: 0 - not at all  Poor appetite or overeatin - not at all  Feeling bad about yourself - or that you are a failure or have let yourself or your family down: 0 - not at all  Trouble concentrating on things, such as reading the newspaper or watching television: 0 - not at all  Moving or speaking so slowly that other people could have noticed. Or the opposite - being so fidgety or restless that you have been moving around a lot more than usual: 0 - not at all  Thoughts that you would be better off dead, or of hurting yourself in some way: 0 - not at all  PHQ-9 Score: 0  PHQ-9 Interpretation: No or Minimal depression       VALERIA-7 Flowsheet Screening    Flowsheet Row Most Recent Value   Over the last 2 weeks, how often have you been bothered by any of the following problems?    Feeling nervous, anxious, or on edge 0   Not being able to stop or control worrying 0   Worrying too much about different things 0   Trouble relaxing 0   Being so restless that it is hard to sit still 0   Becoming easily annoyed or irritable 0   Feeling afraid as if something awful might happen 0   VALERIA-7 Total Score 0        Has what she thinks are tonsil stones since October. Had what she thought was some oatmeal stuck in tonsil. Tried poking at it back in October and was able to remove it. They continue to recur and she removes them with a pimple popper. Takes photos. Throat gets irritated after removing them. Tried gargling with salt water. Wants to see ENT.     She denies any post nasal drip at all.       Review of Systems    Past Medical History:   Diagnosis Date   • ADHD    • Anxiety 2010   • Asthma 2005   • Attempted suicide (HCC)     hospitalized in 2018. [facility] in [place].   • Atypical squamous cell changes of  undetermined significance (ASCUS) on cervical cytology with positive high risk human papilloma virus (HPV)    • Depression    • Dyslexia 2005    since age 10 (fifth grade)   • Epilepsy (HCC)     since age 7 - declared seizure free at age 15 - medication free since age 15   • Heart murmur    • Hypoglycemia    • Sleep apnea     uses cpap machine since      Past Surgical History:   Procedure Laterality Date   • WISDOM TOOTH EXTRACTION Bilateral 2009     Family History   Problem Relation Age of Onset   • Asthma Mother    • Allergies Mother    • Urinary tract infection Mother    • Skin cancer Maternal Grandmother    • Asthma Maternal Grandfather    • Emphysema Maternal Grandfather    • Heart attack Maternal Grandfather          from heart attack at age 83   • Appendicitis Paternal Grandfather         at age 2   • Skin cancer Paternal Grandfather    • Stroke Paternal Grandfather         several     Social History     Socioeconomic History   • Marital status: Single     Spouse name: None   • Number of children: None   • Years of education: None   • Highest education level: None   Occupational History   • None   Tobacco Use   • Smoking status: Never   • Smokeless tobacco: Never   Vaping Use   • Vaping status: Never Used   Substance and Sexual Activity   • Alcohol use: Not Currently     Comment: 1 drink per month   • Drug use: Never   • Sexual activity: Yes     Partners: Male     Birth control/protection: I.U.D.   Other Topics Concern   • None   Social History Narrative    Conservation and wildlife management     Social Determinants of Health     Financial Resource Strain: Not on file   Food Insecurity: Not on file   Transportation Needs: Not on file   Physical Activity: Not on file   Stress: Not on file   Social Connections: Not on file   Intimate Partner Violence: Not on file   Housing Stability: Not on file     Current Outpatient Medications on File Prior to Visit   Medication Sig   • albuterol  "(PROVENTIL HFA,VENTOLIN HFA) 90 mcg/act inhaler Inhale 2 puffs if needed for wheezing   • Biotin 2500 MCG CHEW Chew 2.5 mg in the morning   • buPROPion (WELLBUTRIN XL) 150 mg 24 hr tablet Take 1 tablet (150 mg total) by mouth in the morning   • levonorgestrel (AIDA) 13.5 MG intrauterine device 1 Intra Uterine Device by Intrauterine route once   • Melatonin 5 MG CHEW Chew 2 each as needed   • sertraline (ZOLOFT) 100 mg tablet Take 1.5 tablets (150 mg total) by mouth in the morning   • budesonide-formoterol (SYMBICORT) 160-4.5 mcg/act inhaler Inhale 2 puffs every 4 (four) hours as needed Rinse mouth after use. (Patient not taking: Reported on 8/2/2023)     Allergies   Allergen Reactions   • Bee Venom Swelling   • Dust Mite Extract Sneezing     Several sneezes in a row.    • Sulfa Antibiotics Rash     Gets a rash on the whole body, but it is not itchy just red.    Sulfa in shampoo as a preservative causes hair loss for the patient.      Immunization History   Administered Date(s) Administered   • Tdap 04/27/2023       Objective     /70 (BP Location: Left arm, Patient Position: Sitting, Cuff Size: Standard)   Pulse 83   Temp 99.1 °F (37.3 °C) (Tympanic)   Resp 18   Ht 5' 5.5\" (1.664 m)   Wt 101 kg (222 lb)   SpO2 100%   BMI 36.38 kg/m²     Physical Exam  Vitals and nursing note reviewed.   Constitutional:       General: She is not in acute distress.     Appearance: Normal appearance. She is obese. She is not ill-appearing, toxic-appearing or diaphoretic.   HENT:      Head: Normocephalic and atraumatic.      Right Ear: Tympanic membrane normal.      Left Ear: Tympanic membrane normal.      Nose: Nose normal. No congestion or rhinorrhea.      Mouth/Throat:      Mouth: Mucous membranes are moist.      Pharynx: No oropharyngeal exudate or posterior oropharyngeal erythema.      Comments: Tonsils with no tonsillith  Eyes:      Conjunctiva/sclera: Conjunctivae normal.   Cardiovascular:      Rate and Rhythm: " Normal rate and regular rhythm.      Heart sounds: No murmur heard.  Pulmonary:      Effort: Pulmonary effort is normal.      Breath sounds: Normal breath sounds.   Abdominal:      General: Abdomen is flat. Bowel sounds are normal.      Palpations: Abdomen is soft.   Musculoskeletal:      Cervical back: Normal range of motion and neck supple.      Right lower leg: No edema.      Left lower leg: No edema.   Skin:     General: Skin is warm and dry.      Findings: No rash.   Neurological:      General: No focal deficit present.      Mental Status: She is alert and oriented to person, place, and time.   Psychiatric:         Mood and Affect: Mood normal.       Jemma Vann DO     Infliximab Counseling:  I discussed with the patient the risks of infliximab including but not limited to myelosuppression, immunosuppression, autoimmune hepatitis, demyelinating diseases, lymphoma, and serious infections.  The patient understands that monitoring is required including a PPD at baseline and must alert us or the primary physician if symptoms of infection or other concerning signs are noted.

## 2024-02-08 ENCOUNTER — CLINICAL SUPPORT (OUTPATIENT)
Dept: BARIATRICS | Facility: CLINIC | Age: 29
End: 2024-02-08

## 2024-02-08 VITALS — WEIGHT: 221.8 LBS | HEIGHT: 65 IN | BODY MASS INDEX: 36.96 KG/M2

## 2024-02-08 DIAGNOSIS — R63.5 ABNORMAL WEIGHT GAIN: Primary | ICD-10-CM

## 2024-02-08 PROCEDURE — RECHECK

## 2024-02-15 ENCOUNTER — CLINICAL SUPPORT (OUTPATIENT)
Dept: BARIATRICS | Facility: CLINIC | Age: 29
End: 2024-02-15

## 2024-02-15 VITALS — BODY MASS INDEX: 36.79 KG/M2 | HEIGHT: 65 IN | WEIGHT: 220.8 LBS

## 2024-02-15 DIAGNOSIS — R63.5 ABNORMAL WEIGHT GAIN: Primary | ICD-10-CM

## 2024-02-15 PROCEDURE — RECHECK

## 2024-02-23 ENCOUNTER — OFFICE VISIT (OUTPATIENT)
Dept: FAMILY MEDICINE CLINIC | Facility: CLINIC | Age: 29
End: 2024-02-23
Payer: COMMERCIAL

## 2024-02-23 VITALS
HEIGHT: 65 IN | SYSTOLIC BLOOD PRESSURE: 106 MMHG | BODY MASS INDEX: 37.15 KG/M2 | HEART RATE: 83 BPM | OXYGEN SATURATION: 98 % | TEMPERATURE: 98.7 F | WEIGHT: 223 LBS | DIASTOLIC BLOOD PRESSURE: 80 MMHG

## 2024-02-23 DIAGNOSIS — J06.9 VIRAL URI: Primary | ICD-10-CM

## 2024-02-23 PROCEDURE — 99212 OFFICE O/P EST SF 10 MIN: CPT | Performed by: FAMILY MEDICINE

## 2024-02-23 NOTE — LETTER
February 23, 2024     Patient: Lita Verdugo  YOB: 1995  Date of Visit: 2/23/2024      To Whom it May Concern:    Lita Verdugo is under my professional care. Lita was seen in my office on 2/23/2024. Lita may return to school on 2/26/24 . Please excuse her absence on 2/21/24 and 2/22/24.     If you have any questions or concerns, please don't hesitate to call.         Sincerely,          Jemma Vann DO        CC: No Recipients

## 2024-02-23 NOTE — PROGRESS NOTES
Name: Lita Verdugo      : 1995      MRN: [id number]  Encounter Provider: Jemma Vann DO  Encounter Date: 2024   Encounter department: [facility] PRIMARY CARE    Assessment & Plan     1. Viral URI    Patient with viral URI sx earlier this week, requesting note for school. She reports that sx have resolved entirely. Note written.        Subjective     HPI  Patient is a 28 year old female with anxiety, depression, ADHD, HARLEY, asthma and allergies who is here today because she needs a note for school.     She reports that she has had a cold since the weekend. Started 4 days ago with sore throat. Then developed nasal congestion.  Goes to [employer]. Missed school on Tuesday and Wednesday. Needs note for the  and .   Feels fine now.   She sees gyn for her paps and is scheduled for this.     Has not scheduled anything for her tonsil stone.     Review of Systems    Past Medical History:   Diagnosis Date   • ADHD    • Anxiety    • Asthma    • Attempted suicide (HCC)     hospitalized in 2018. [facility] in [place].   • Atypical squamous cell changes of undetermined significance (ASCUS) on cervical cytology with positive high risk human papilloma virus (HPV)    • Depression    • Dyslexia     since age 10 (fifth grade)   • Epilepsy (HCC)     since age 7 - declared seizure free at age 15 - medication free since age 15   • Heart murmur    • Hypoglycemia    • Sleep apnea     uses cpap machine since      Past Surgical History:   Procedure Laterality Date   • WISDOM TOOTH EXTRACTION Bilateral      Family History   Problem Relation Age of Onset   • Asthma Mother    • Allergies Mother    • Urinary tract infection Mother    • Skin cancer Maternal Grandmother    • Asthma Maternal Grandfather    • Emphysema Maternal Grandfather    • Heart attack Maternal Grandfather          from heart attack at age 83   • Appendicitis Paternal Grandfather          at age 2   • Skin cancer Paternal Grandfather    • Stroke Paternal Grandfather         several     Social History     Socioeconomic History   • Marital status: Single     Spouse name: None   • Number of children: None   • Years of education: None   • Highest education level: None   Occupational History   • None   Tobacco Use   • Smoking status: Never   • Smokeless tobacco: Never   Vaping Use   • Vaping status: Never Used   Substance and Sexual Activity   • Alcohol use: Not Currently     Comment: 1 drink per month   • Drug use: Never   • Sexual activity: Yes     Partners: Male     Birth control/protection: I.U.D.   Other Topics Concern   • None   Social History Narrative    Conservation and wildlife management     Social Determinants of Health     Financial Resource Strain: Not on file   Food Insecurity: Not on file   Transportation Needs: Not on file   Physical Activity: Not on file   Stress: Not on file   Social Connections: Not on file   Intimate Partner Violence: Not on file   Housing Stability: Not on file     Current Outpatient Medications on File Prior to Visit   Medication Sig   • albuterol (PROVENTIL HFA,VENTOLIN HFA) 90 mcg/act inhaler Inhale 2 puffs if needed for wheezing   • Biotin 2500 MCG CHEW Chew 2.5 mg in the morning   • budesonide-formoterol (SYMBICORT) 160-4.5 mcg/act inhaler Inhale 2 puffs every 4 (four) hours as needed Rinse mouth after use.   • buPROPion (WELLBUTRIN XL) 150 mg 24 hr tablet Take 1 tablet (150 mg total) by mouth in the morning   • levonorgestrel (AIDA) 13.5 MG intrauterine device 1 Intra Uterine Device by Intrauterine route once   • Melatonin 5 MG CHEW Chew 2 each as needed   • sertraline (ZOLOFT) 100 mg tablet Take 1.5 tablets (150 mg total) by mouth in the morning     Allergies   Allergen Reactions   • Bee Venom Swelling   • Dust Mite Extract Sneezing     Several sneezes in a row.    • Sulfa Antibiotics Rash     Gets a rash on the whole body, but it is not itchy just  "red.    Sulfa in shampoo as a preservative causes hair loss for the patient.      Immunization History   Administered Date(s) Administered   • Tdap 04/27/2023       Objective     /80 (BP Location: Left arm, Patient Position: Sitting, Cuff Size: Large)   Pulse 83   Temp 98.7 °F (37.1 °C) (Tympanic)   Ht 5' 5\" (1.651 m)   Wt 101 kg (223 lb)   SpO2 98%   BMI 37.11 kg/m²     Physical Exam  Vitals and nursing note reviewed.   Constitutional:       General: She is not in acute distress.     Appearance: Normal appearance. She is obese. She is not ill-appearing, toxic-appearing or diaphoretic.   HENT:      Head: Normocephalic and atraumatic.      Right Ear: Tympanic membrane normal.      Ears:      Comments: Serous effusion left TM     Nose: Nose normal.      Mouth/Throat:      Mouth: Mucous membranes are moist.      Pharynx: No oropharyngeal exudate or posterior oropharyngeal erythema.   Cardiovascular:      Rate and Rhythm: Normal rate and regular rhythm.      Heart sounds: No murmur heard.  Pulmonary:      Effort: Pulmonary effort is normal.      Breath sounds: Normal breath sounds. No wheezing, rhonchi or rales.   Lymphadenopathy:      Cervical: No cervical adenopathy.   Neurological:      Mental Status: She is alert.   Jemma Vann,     "

## 2024-03-07 ENCOUNTER — CLINICAL SUPPORT (OUTPATIENT)
Dept: BARIATRICS | Facility: CLINIC | Age: 29
End: 2024-03-07

## 2024-03-07 VITALS — WEIGHT: 223 LBS | HEIGHT: 65 IN | BODY MASS INDEX: 37.15 KG/M2

## 2024-03-07 DIAGNOSIS — R63.5 ABNORMAL WEIGHT GAIN: Primary | ICD-10-CM

## 2024-03-07 PROCEDURE — RECHECK

## 2024-03-11 NOTE — PROGRESS NOTES
Weight Management Medical Nutrition Assessment  Lita presented for a month 2 f/u visit. Today's weight is 221.6#. Loss of 1# x 8 weeks. Overall loss of 7.8# (3.5%TBW). She is not food logging. Sometimes forgets to eat when she is doing school work/studying. Graduating in May. Reports she needs more consistency but believes this will be more realistic after her school semester ends. Goal to start exercising more than once per week. Prefers to exercise outside. Goal to lose 2# before next RD visit on 4/11/24.    Patient seen by Medical Provider in past 6 months:  no  Requested to schedule appointment with Medical Provider: No      Anthropometric Measurements  Start Weight (#): 229.4#  Current Weight (#): 221.6#  TBW % Change from start weight: 3.5%  Ideal Body Weight (#): 128.75#  Goal Weight (#):165-175#  Highest:  Lowest:    Weight Loss History  Previous weight loss attempts: Commercial Programs (Weight Watchers, Renetta Fonseca, etc.), Noom    Food and Nutrition Related History  Wake up: 7-8AM, Fridays 6AM, or 11AM (if she does not have classes)  Bed Time: n/a    Food Recall  Breakfast: 8:30AM-11AM eggs + [employer] maple sausage OR [employer] xavi, egg, and cheese pocket  bagel with butter or light cream cheese   Snack:skip  Lunch: skip when she sleeps in OR [employer] chicken nuggets OR yogurt + granola OR flavored Uncle Bens rice OR granola bar   Snack: skip  Dinner: 7-8PM rice + beans + vegetables (onions, tomatoes, spinach, peppers, corn) OR [employer] veggie burger + cheese + 647 bun + ketchup, avina, hot sauce + pickles  Snack: peanuts OR skip    Beverages: 64oz water (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- sometimes uses [employer] for flavor), lemonade,  1 cup coffee with 1 Tbsp natural Coffeemate creamer  Alcohol: rare  Volume of beverage intake: at least 72oz, 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     Weekends: will cook on weekends  Cravings: n/a  Trouble area of day:  n/a    Frequency of Eating out: [employer] (1-2 times per week, reduced dining out)  Food restrictions: allergy to acorn squash, vegetarian   Cooking: self   Food Shopping: self    Physical Activity Intake  Activity: spends Fridays on a ranch (lifting, walking, etc.); adding exercise class on Mondays   Frequency:occasionally  Physical limitations/barriers to exercise: n/a    Estimated Needs   Energy  Bairon Cabrera Energy Needs: BMR : 1754   1-2# loss weekly sedentary: 6535-5659            1-2# loss weekly lightly active: 3327-7409  Maintenance calories for sedentary activity level: 2105  Protein: 70-88g      (1.2-1.5g/kg IBW)  Fluid: 69oz    (35mL/kg IBW)    Nutrition Diagnosis  Yes;    Overweight/obesity  related to Excess energy intake as evidenced by  BMI more than normative standard for age and sex (obesity-grade II 35-39.9)       Nutrition Intervention    Nutrition Prescription  Calories: 7705-3991 calories  Protein: 70-88g  Fluid: 69oz    Meal Plan (Barrera/Pro/Carb)  Snack: 200/5, +100 for beverage  Breakfast: 400/25  Lunch:  Snack: 150-200/5  Dinner: 500/25  Snack: 100/5    Nutrition Education:    Calorie controlled menu  Lean protein food choices  Healthy snack options  Food journaling tips  Healthy Core Manual      Nutrition Counseling:  Strategies: meal planning, portion sizes, healthy snack choices, hydration, fiber intake, protein intake, exercise, food journal      Monitoring and Evaluation:  Evaluation criteria:  Energy Intake  Meet protein needs  Maintain adequate hydration  Monitor weekly weight  Meal planning/preparation  Food journal   Decreased portions at mealtimes and snacks  Physical activity     Barriers to learning:none  Readiness to change: Action:  (Changing behavior)  Comprehension: good  Expected Compliance: good

## 2024-03-14 ENCOUNTER — CLINICAL SUPPORT (OUTPATIENT)
Dept: BARIATRICS | Facility: CLINIC | Age: 29
End: 2024-03-14

## 2024-03-14 VITALS — WEIGHT: 222.8 LBS | BODY MASS INDEX: 37.12 KG/M2 | HEIGHT: 65 IN

## 2024-03-14 DIAGNOSIS — R63.5 ABNORMAL WEIGHT GAIN: Primary | ICD-10-CM

## 2024-03-14 PROCEDURE — RECHECK

## 2024-03-20 ENCOUNTER — CLINICAL SUPPORT (OUTPATIENT)
Dept: BARIATRICS | Facility: CLINIC | Age: 29
End: 2024-03-20

## 2024-03-20 VITALS — WEIGHT: 221.6 LBS | HEIGHT: 65 IN | BODY MASS INDEX: 36.92 KG/M2

## 2024-03-20 DIAGNOSIS — R63.5 ABNORMAL WEIGHT GAIN: Primary | ICD-10-CM

## 2024-03-20 PROCEDURE — RECHECK

## 2024-03-21 ENCOUNTER — CLINICAL SUPPORT (OUTPATIENT)
Dept: BARIATRICS | Facility: CLINIC | Age: 29
End: 2024-03-21

## 2024-03-21 VITALS — WEIGHT: 223.4 LBS | BODY MASS INDEX: 37.22 KG/M2 | HEIGHT: 65 IN

## 2024-03-21 DIAGNOSIS — R63.5 ABNORMAL WEIGHT GAIN: Primary | ICD-10-CM

## 2024-03-21 PROCEDURE — RECHECK

## 2024-03-28 ENCOUNTER — CLINICAL SUPPORT (OUTPATIENT)
Dept: BARIATRICS | Facility: CLINIC | Age: 29
End: 2024-03-28

## 2024-03-28 VITALS — HEIGHT: 65 IN | WEIGHT: 224.4 LBS | BODY MASS INDEX: 37.39 KG/M2

## 2024-03-28 DIAGNOSIS — R63.5 ABNORMAL WEIGHT GAIN: Primary | ICD-10-CM

## 2024-03-28 PROCEDURE — RECHECK

## 2024-04-11 ENCOUNTER — TELEPHONE (OUTPATIENT)
Dept: BARIATRICS | Facility: CLINIC | Age: 29
End: 2024-04-11

## 2024-04-11 NOTE — TELEPHONE ENCOUNTER
Janee for pt to call back and r/s 4/11/24 m[address] rd appt and to confirm she is coming to 5pm class tonight

## 2024-04-18 ENCOUNTER — CLINICAL SUPPORT (OUTPATIENT)
Dept: BARIATRICS | Facility: CLINIC | Age: 29
End: 2024-04-18

## 2024-04-18 VITALS — BODY MASS INDEX: 37.75 KG/M2 | HEIGHT: 65 IN | WEIGHT: 226.6 LBS

## 2024-04-18 DIAGNOSIS — R63.5 ABNORMAL WEIGHT GAIN: Primary | ICD-10-CM

## 2024-04-18 PROCEDURE — RECHECK

## 2024-05-07 ENCOUNTER — OFFICE VISIT (OUTPATIENT)
Dept: BARIATRICS | Facility: CLINIC | Age: 29
End: 2024-05-07
Payer: COMMERCIAL

## 2024-05-07 VITALS
SYSTOLIC BLOOD PRESSURE: 108 MMHG | HEIGHT: 65 IN | BODY MASS INDEX: 37.65 KG/M2 | TEMPERATURE: 97.9 F | RESPIRATION RATE: 16 BRPM | HEART RATE: 98 BPM | DIASTOLIC BLOOD PRESSURE: 60 MMHG | WEIGHT: 226 LBS

## 2024-05-07 DIAGNOSIS — G47.33 OSA ON CPAP: ICD-10-CM

## 2024-05-07 DIAGNOSIS — E55.9 VITAMIN D DEFICIENCY: ICD-10-CM

## 2024-05-07 DIAGNOSIS — E66.9 OBESITY, CLASS II, BMI 35-39.9: Primary | ICD-10-CM

## 2024-05-07 DIAGNOSIS — Z86.69 HISTORY OF SEIZURE DISORDER: ICD-10-CM

## 2024-05-07 PROCEDURE — 99203 OFFICE O/P NEW LOW 30 MIN: CPT | Performed by: INTERNAL MEDICINE

## 2024-05-07 NOTE — ASSESSMENT & PLAN NOTE
Recommend avoiding increasing bupropion and recommend avoiding starting topiramate unless neurology is on board.

## 2024-05-07 NOTE — PATIENT INSTRUCTIONS
General Recommendations:  Nutrition:  Eat breakfast daily.  Do not skip meals.     Food log (ie.) www.[employer].com, sparkpeople.com, loseit.com, calorieking.com, etc.    Practice mindful eating.  Be sure to set aside time to eat, eat slowly, and savor your food.    Hydration:    At least 64oz of water daily.  No sugar sweetened beverages.  No juice (eat the fruit instead).    Exercise:  Studies have shown that the ideal exercise goal is somewhere between 150 to 300 minutes of moderate intensity exercise a week.  Start with exercising 10 minutes every other day and gradually increase physical activity with a goal of at least 150 minutes of moderate intensity exercise a week, divided over at least 3 days a week.  An example of this would be exercising 30 minutes a day, 5 days a week.  Resistance training can increase muscle mass and increase our resting metabolic rate.   FULL BODY resistance training is recommended 2-3 times a week.  Do not do this on consecutive days to allow for muscle recovery.    Aim for a bare minimum 5000 steps, even on days you do not exercise.    Monitoring:   Weigh yourself daily.  If this causes undue stress, then just weigh yourself once a week.  Weigh yourself the same time of the day with the same amount of clothing on.  Preferably this should be done after waking up, before you eat, and with no clothing or minimal clothing on.    Specific Goals:  Food log (ie.) www.[employer].com,sparkpeople.com,loseit.com,InnovEco.com,etc.     No sugary beverages. At least 64oz of water daily.    Watch virtual seminar on bariatric surgery.  Consider options and contact the office with any other questions you may have.    Return visit:  as needed

## 2024-05-07 NOTE — PROGRESS NOTES
Assessment/Plan:  Lita was seen today for follow-up.    Diagnoses and all orders for this visit:    Obesity, Class II, BMI 35-39.9    History of seizure disorder    Vitamin D deficiency  -     Vitamin D 25 hydroxy; Future  -     Vitamin D 25 hydroxy    HARLEY on CPAP       History of seizure disorder  Recommend avoiding increasing bupropion and recommend avoiding starting topiramate unless neurology is on board.     - Discussed options of [facility]-Intensive Lifestyle Intervention Program, Very Low Calorie Diet-VLCD, Conservative Program, Stacie-En-Y Gastric Bypass, and Vertical Sleeve Gastrectomy and the role of weight loss medications.  - Patient is weighing out options and will be relocating to [place], [place].  She will likely establish with a weight loss practice in that area and decided which way to go after discussing with family.  She expressed interested in bariatric surgery.  - Initial weight loss goal of 5-10% weight loss for improved health as studies have shown this is where we see the greatest impact on improving health and decreasing risk of obesity related conditions.  - Weight loss can improve patient's co-morbid conditions and/or prevent weight-related complications.  - HARLEY on CPAP  - Labs reviewed: As below.    -Phentermine may increase anxiety.  Increasing bupropion is not advised as this may increase seizure threshold.  In fact she is advised to discuss coming off of this medication with her PCP.  Patient tells me that her PCP is aware of this risk and also cautioned her against it.  She was already on it, prescribed by a nurse practitioner that she was seeing prior.  She has remained on as she has been doing well thus far.  Topiramate may be used but with caution, especially with discontinuation as rapid discontinuation may trigger seizure.  Discussed the risk and benefit of each medication above including the GLP-1 receptor agonist class.  She does not wish to take a medication at this  time.    General Recommendations:  Nutrition:  Eat breakfast daily.  Do not skip meals.     Food log (ie.) www.AppsFlyer.com, sparkpeople.com, loseit.com, calorieking.com, etc.    Practice mindful eating.  Be sure to set aside time to eat, eat slowly, and savor your food.    Hydration:    At least 64oz of water daily.  No sugar sweetened beverages.  No juice (eat the fruit instead).    Exercise:  Studies have shown that the ideal exercise goal is somewhere between 150 to 300 minutes of moderate intensity exercise a week.  Start with exercising 10 minutes every other day and gradually increase physical activity with a goal of at least 150 minutes of moderate intensity exercise a week, divided over at least 3 days a week.  An example of this would be exercising 30 minutes a day, 5 days a week.  Resistance training can increase muscle mass and increase our resting metabolic rate.   FULL BODY resistance training is recommended 2-3 times a week.  Do not do this on consecutive days to allow for muscle recovery.    Aim for a bare minimum 5000 steps, even on days you do not exercise.    Monitoring:   Weigh yourself daily.  If this causes undue stress, then just weigh yourself once a week.  Weigh yourself the same time of the day with the same amount of clothing on.  Preferably this should be done after waking up, before you eat, and with no clothing or minimal clothing on.    Specific Goals:  Food log (ie.) www.myfitnesspal.com,sparkpeople.com,loseit.com,calorieking.com,etc.     No sugary beverages. At least 64oz of water daily.    Watch virtual seminar on bariatric surgery.  Consider options and contact the office with any other questions you may have.    Return visit:  as needed   Total time spent reviewing chart, interviewing patient, examining patient, discussing plan, answering all questions, and documentin min.       ______________________________________________________________________    Subjective:   Chief  "Complaint   Patient presents with   • Follow-up     MWM- 16 wk f/u     HPI: Lita Verdugo  is a 29 y.o. female with history of excess weight, here to pursue weight loss management.  Previous notes and records have been reviewed.  Patient establish care with Deborah Bazzi RD, on 10/3/2023.  Weight at that time was 229.  Prior to this she had completed Noom and weight watchers.  She admits to difficulty remembering to food was agreeable to do so.  Vegetarian in the past but no longer.  Concerned about portion sizes.  Today her weight is 226. (-3lbs)  Goal weight: 165-175 lbs  HPI  Wt Readings from Last 20 Encounters:   05/07/24 103 kg (226 lb)   04/18/24 103 kg (226 lb 9.6 oz)   03/28/24 102 kg (224 lb 6.4 oz)   03/21/24 101 kg (223 lb 6.4 oz)   03/20/24 101 kg (221 lb 9.6 oz)   03/14/24 101 kg (222 lb 12.8 oz)   03/07/24 101 kg (223 lb)   02/23/24 101 kg (223 lb)   02/15/24 100 kg (220 lb 12.8 oz)   02/08/24 101 kg (221 lb 12.8 oz)   02/05/24 101 kg (222 lb)   02/01/24 101 kg (221 lb 9.6 oz)   01/25/24 101 kg (221 lb 12.8 oz)   01/18/24 100 kg (220 lb 9.6 oz)   01/15/24 101 kg (222 lb 12.8 oz)   01/11/24 101 kg (222 lb 3.2 oz)   01/04/24 100 kg (221 lb 3.2 oz)   12/18/23 102 kg (224 lb 11.2 oz)   11/01/23 101 kg (222 lb 6.4 oz)   10/03/23 104 kg (229 lb 6.4 oz)     Food Recall from note with dietitian on 3/20/2024:  \"Breakfast: 8:30AM-11AM eggs + [employer] maple sausage OR [employer] xavi, egg, and cheese pocket  bagel with butter or light cream cheese   Snack:skip  Lunch: skip when she sleeps in OR [employer] chicken nuggets OR yogurt + granola OR flavored [employer] rice OR granola bar   Snack: skip  Dinner: 7-8PM rice + beans + vegetables (onions, tomatoes, spinach, peppers, corn) OR [employer] veggie burger + cheese + 647 bun + ketchup, avina, hot sauce + pickles  Snack: peanuts OR skip    Beverages: 64oz water (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- sometimes uses [place] for " "flavor), lemonade,  1 cup coffee with 1 Tbsp natural [place] creamer  Alcohol: rare  Volume of beverage intake: at least 72oz, 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  Volunteers on [employer].    Past Medical History:   Diagnosis Date   • ADHD 2020   • Anxiety 2010   • Asthma 2005   • Attempted suicide (HCC)     hospitalized in 2018. [facility] in [place].   • Atypical squamous cell changes of undetermined significance (ASCUS) on cervical cytology with positive high risk human papilloma virus (HPV)    • Depression 2010   • Dyslexia 2005    since age 10 (fifth grade)   • Epilepsy (HCC) 2002    since age 7 - declared seizure free at age 15 - medication free since age 15   • Heart murmur    • Hypoglycemia 2013   • Sleep apnea 2021    uses cpap machine since 2022     Patient denies personal and family history of pancreatitis, thyroid cancer, MEN-2 tumors.  Denies any hx of glaucoma, kidney stones, gallstones.  + seizures  Denies Hx of CAD, PAD, palpitations, arrhythmia.   Denies uncontrolled anxiety or depression, suicidal behavior or thinking, insomnia or sleep disturbance.     Past Surgical History:   Procedure Laterality Date   • WISDOM TOOTH EXTRACTION Bilateral 2009     The following portions of the patient's history were reviewed and updated as appropriate: allergies, current medications, past family history, past medical history, past social history, past surgical history, and problem list.    Review Of Systems:  Review of Systems   Constitutional:  Negative for activity change, appetite change, fatigue and fever.   Respiratory:  Negative for cough and shortness of breath.    Cardiovascular:  Negative for chest pain, palpitations and leg swelling. " "  Gastrointestinal:  Negative for abdominal pain, constipation, diarrhea, nausea and vomiting.   Endocrine: Negative for cold intolerance and heat intolerance.   Genitourinary:  Negative for difficulty urinating and dysuria.   Musculoskeletal:  Negative for arthralgias, back pain and gait problem.   Skin:  Negative for pallor and rash.   Neurological:  Negative for headaches.   Psychiatric/Behavioral:  Negative for dysphoric mood, sleep disturbance and suicidal ideas (or HI). The patient is not nervous/anxious.        Objective:  /60   Pulse 98   Temp 97.9 °F (36.6 °C)   Resp 16   Ht 5' 5\" (1.651 m)   Wt 103 kg (226 lb)   BMI 37.61 kg/m²   Physical Exam  Vitals and nursing note reviewed.   Constitutional:       General: She is not in acute distress.     Appearance: Normal appearance. She is not ill-appearing or diaphoretic.   Eyes:      General: No scleral icterus.  Cardiovascular:      Rate and Rhythm: Normal rate and regular rhythm.      Pulses: Normal pulses.      Heart sounds: No murmur heard.  Pulmonary:      Effort: Pulmonary effort is normal. No respiratory distress.      Breath sounds: Normal breath sounds. No wheezing or rhonchi.   Abdominal:      General: Bowel sounds are normal. There is no distension.      Palpations: Abdomen is soft. There is no mass.      Tenderness: There is no abdominal tenderness.   Musculoskeletal:      Cervical back: Neck supple.      Right lower leg: No edema.      Left lower leg: No edema.   Lymphadenopathy:      Cervical: No cervical adenopathy.   Skin:     Capillary Refill: Capillary refill takes less than 2 seconds.      Findings: No lesion or rash.   Neurological:      Mental Status: She is alert and oriented to person, place, and time.      Gait: Gait normal.   Psychiatric:         Mood and Affect: Mood normal.         Behavior: Behavior normal.       Labs and Imaging  Recent labs and imaging have been personally reviewed.  No results found for: \"WBC\", \"HGB\", " "\"HCT\", \"MCV\", \"PLT\"  Lab Results   Component Value Date    SODIUM 137 05/01/2023    K 4.2 05/01/2023     05/01/2023    CO2 29 05/01/2023    BUN 7 05/01/2023    CREATININE 0.62 05/01/2023    GLUC 83 05/01/2023    CALCIUM 9.8 05/01/2023    AST 20 05/01/2023    ALT 24 05/01/2023    ALKPHOS 85 05/01/2023    TP 7.7 05/01/2023    TBILI 0.7 05/01/2023    EGFR 125 05/01/2023     No results found for: \"HGBA1C\"  No results found for: \"FSY2MFDSNRMV\", \"TSH\"  Lab Results   Component Value Date    CHOLESTEROL 164 05/01/2023     Lab Results   Component Value Date    HDL 45 (L) 05/01/2023     Lab Results   Component Value Date    TRIG 130 05/01/2023     Lab Results   Component Value Date    LDLCALC 96 05/01/2023      "

## 2024-11-10 DIAGNOSIS — F41.9 ANXIETY: ICD-10-CM

## 2024-11-10 DIAGNOSIS — F32.9 CURRENT EPISODE OF MAJOR DEPRESSIVE DISORDER WITHOUT PRIOR EPISODE, UNSPECIFIED DEPRESSION EPISODE SEVERITY: ICD-10-CM

## 2024-11-12 RX ORDER — BUPROPION HYDROCHLORIDE 150 MG/1
150 TABLET ORAL DAILY
Qty: 90 TABLET | Refills: 0 | Status: SHIPPED | OUTPATIENT
Start: 2024-11-12

## 2025-02-08 DIAGNOSIS — F32.9 CURRENT EPISODE OF MAJOR DEPRESSIVE DISORDER WITHOUT PRIOR EPISODE, UNSPECIFIED DEPRESSION EPISODE SEVERITY: ICD-10-CM

## 2025-02-08 DIAGNOSIS — F41.9 ANXIETY: ICD-10-CM

## 2025-02-10 RX ORDER — BUPROPION HYDROCHLORIDE 150 MG/1
150 TABLET ORAL DAILY
Qty: 90 TABLET | Refills: 0 | Status: SHIPPED | OUTPATIENT
Start: 2025-02-10